# Patient Record
Sex: MALE | Race: WHITE | NOT HISPANIC OR LATINO | Employment: FULL TIME | URBAN - NONMETROPOLITAN AREA
[De-identification: names, ages, dates, MRNs, and addresses within clinical notes are randomized per-mention and may not be internally consistent; named-entity substitution may affect disease eponyms.]

---

## 2019-04-15 ENCOUNTER — APPOINTMENT (OUTPATIENT)
Dept: CHIROPRACTIC MEDICINE | Facility: OTHER | Age: 34
End: 2019-04-15

## 2019-04-15 ENCOUNTER — APPOINTMENT (OUTPATIENT)
Dept: OCCUPATIONAL MEDICINE | Facility: OTHER | Age: 34
End: 2019-04-15

## 2019-04-15 PROCEDURE — 99499 UNLISTED E&M SERVICE: CPT

## 2022-12-19 ENCOUNTER — HOSPITAL ENCOUNTER (EMERGENCY)
Facility: HOSPITAL | Age: 37
Discharge: HOME | End: 2022-12-19
Attending: NURSE PRACTITIONER | Admitting: NURSE PRACTITIONER
Payer: COMMERCIAL

## 2022-12-19 ENCOUNTER — APPOINTMENT (OUTPATIENT)
Dept: GENERAL RADIOLOGY | Facility: HOSPITAL | Age: 37
End: 2022-12-19
Attending: NURSE PRACTITIONER
Payer: COMMERCIAL

## 2022-12-19 VITALS
WEIGHT: 190 LBS | RESPIRATION RATE: 16 BRPM | BODY MASS INDEX: 25.18 KG/M2 | SYSTOLIC BLOOD PRESSURE: 122 MMHG | HEIGHT: 73 IN | DIASTOLIC BLOOD PRESSURE: 64 MMHG | HEART RATE: 90 BPM | OXYGEN SATURATION: 99 % | TEMPERATURE: 99.5 F

## 2022-12-19 DIAGNOSIS — M25.469 SUPRAPATELLAR EFFUSION OF KNEE: Primary | ICD-10-CM

## 2022-12-19 PROCEDURE — 73562 X-RAY EXAM OF KNEE 3: CPT | Mod: RT

## 2022-12-19 PROCEDURE — G0463 HOSPITAL OUTPT CLINIC VISIT: HCPCS

## 2022-12-19 PROCEDURE — 99213 OFFICE O/P EST LOW 20 MIN: CPT | Performed by: NURSE PRACTITIONER

## 2022-12-19 ASSESSMENT — ENCOUNTER SYMPTOMS
JOINT SWELLING: 1
MYALGIAS: 1

## 2022-12-19 ASSESSMENT — ACTIVITIES OF DAILY LIVING (ADL): ADLS_ACUITY_SCORE: 35

## 2022-12-19 NOTE — ED NOTES
Right knee pain.  Cant recall any injury.  Woke up Friday night with it starting to swell.  Cant bear weight. Slight fever but pt stated hes got a cold right now.  No fall on it.  Was shoveling and snowblowing and thinks he could have slipped and twisted. No redness noted.  Swelling noted.  Had it ace wrapped and using crutches

## 2022-12-19 NOTE — DISCHARGE INSTRUCTIONS
Use the Ace wrap to your knee, apply ice packs and elevate affected knee at rest.    Tylenol or ibuprofen as needed for pain.    Schedule an appointment with orthopedics for follow-up.    Return to urgent care or emergency department for any concerning symptoms.

## 2022-12-19 NOTE — ED PROVIDER NOTES
"  History     Chief Complaint   Patient presents with     Joint Swelling     Knee Pain     HPI  Jaspreet Bustillo is a 37 year old male who presents to urgent care for evaluation of her right knee pain and swelling.  He woke up 2 days ago with swelling to his knee.  He denies any trauma or injury.  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  Pain is most noticeable when he tries to move his knee or bear weight to his right leg.  He is using crutches today and he has been Ace wrapping it.  He did take Tylenol prior to this arrival.    No history of surgeries to this knee.  He did have an MRI done 10 years ago which showed that he had chondromalacia patella.    Allergies:  No Known Allergies    Problem List:    There are no problems to display for this patient.       Past Medical History:    History reviewed. No pertinent past medical history.    Past Surgical History:    History reviewed. No pertinent surgical history.    Family History:    History reviewed. No pertinent family history.    Social History:  Marital Status:  Single [1]  Social History     Tobacco Use     Smoking status: Former   Substance Use Topics     Alcohol use: Yes     Comment: occcasionally     Drug use: Yes     Types: Marijuana     Comment: daily        Medications:    No current outpatient medications on file.        Review of Systems   Musculoskeletal: Positive for gait problem, joint swelling and myalgias.   All other systems reviewed and are negative.      Physical Exam   BP: 122/64  Pulse: 90  Temp: 99.5  F (37.5  C)  Resp: 16  Height: 185.4 cm (6' 1\")  Weight: 86.2 kg (190 lb)  SpO2: 99 %      Physical Exam  Vitals and nursing note reviewed.   Constitutional:       Appearance: Normal appearance. He is not ill-appearing or toxic-appearing.   HENT:      Head: Atraumatic.   Eyes:      Pupils: Pupils are equal, round, and " reactive to light.   Cardiovascular:      Rate and Rhythm: Normal rate.   Pulmonary:      Effort: Pulmonary effort is normal.   Musculoskeletal:         General: Swelling and tenderness present.      Cervical back: Neck supple.      Right knee: Swelling and effusion present. No deformity, erythema, ecchymosis, lacerations, bony tenderness or crepitus. Decreased range of motion. Tenderness present over the lateral joint line and LCL. No medial joint line, MCL, ACL, PCL or patellar tendon tenderness. No LCL laxity, MCL laxity, ACL laxity or PCL laxity. Normal meniscus and normal patellar mobility.      Instability Tests: Anterior drawer test negative. Posterior drawer test negative.   Skin:     General: Skin is warm and dry.      Capillary Refill: Capillary refill takes less than 2 seconds.      Findings: No bruising or erythema.   Neurological:      Mental Status: He is alert and oriented to person, place, and time.         ED Course                 Procedures            Results for orders placed or performed during the hospital encounter of 12/19/22 (from the past 24 hour(s))   XR Knee Right 3 Views    Narrative    PROCEDURE:  XR KNEE RIGHT 3 VIEWS    HISTORY: swelling pain.    COMPARISON:  None.    TECHNIQUE:  3 views right knee.    FINDINGS:  No fracture or dislocation is identified. The joint spaces  are preserved. Suprapatellar effusion is seen. No foreign body is  seen.       Impression    IMPRESSION: Suprapatellar effusion without acute fracture.      FUNMILAYO ORNELAS MD         SYSTEM ID:  [id number]       Medications - No data to display    Assessments & Plan (with Medical Decision Making)     I have reviewed the nursing notes.    37-year-old male that presented for evaluation of right knee pain and swelling that started 3 days ago with no known trauma or injury.  No LCL or MCL laxity.  He does have decreased range of motion to the right knee.  X-ray is negative for acute fractures but does show a  suprapatellar effusion.    Findings were discussed with patient.  Recommended continue to use the Ace wrap to his knee.  Rest, apply ice and elevate affected extremity.  Continue using crutches to ambulate.  Tylenol or ibuprofen as needed for pain.  [facility] with patient advised to follow-up as needed if no improvement in his symptoms.  He may also follow-up with his primary medical provider.  Return to urgent care emergency department for any concerning symptoms.  I have reviewed the findings, diagnosis, plan and need for follow up with the patient.  This document was prepared using a combination of typing and voice generated software.  While every attempt was made for accuracy, spelling and grammatical errors may exist.    New Prescriptions    No medications on file       Final diagnoses:   Suprapatellar effusion of knee       12/19/2022   [facility]     Mpofu, Prudence, CNP  12/19/22 [id number]

## 2022-12-19 NOTE — ED TRIAGE NOTES
Right knee pain and swelling denies trauma was doing allot of shoveling and may have twisted it wrong. CMS intact. Painful to bend. On crutches and has it ace wrapped. URI for 3 days. No respiratory difficulty     Triage Assessment     Row Name 12/19/22 1000       Triage Assessment (Adult)    Airway WDL WDL       Respiratory WDL    Respiratory WDL WDL       Skin Circulation/Temperature WDL    Skin Circulation/Temperature WDL WDL       Cardiac WDL    Cardiac WDL WDL       Peripheral/Neurovascular WDL    Peripheral Neurovascular WDL WDL       Cognitive/Neuro/Behavioral WDL    Cognitive/Neuro/Behavioral WDL WDL